# Patient Record
Sex: MALE | Race: BLACK OR AFRICAN AMERICAN | Employment: UNEMPLOYED | URBAN - METROPOLITAN AREA
[De-identification: names, ages, dates, MRNs, and addresses within clinical notes are randomized per-mention and may not be internally consistent; named-entity substitution may affect disease eponyms.]

---

## 2021-01-04 ENCOUNTER — HOSPITAL ENCOUNTER (EMERGENCY)
Age: 31
Discharge: ANOTHER HOSPITAL | End: 2021-01-05
Attending: EMERGENCY MEDICINE
Payer: MEDICAID

## 2021-01-04 ENCOUNTER — APPOINTMENT (OUTPATIENT)
Dept: CT IMAGING | Age: 31
End: 2021-01-04
Attending: EMERGENCY MEDICINE
Payer: MEDICAID

## 2021-01-04 ENCOUNTER — APPOINTMENT (OUTPATIENT)
Dept: GENERAL RADIOLOGY | Age: 31
End: 2021-01-04
Attending: EMERGENCY MEDICINE
Payer: MEDICAID

## 2021-01-04 DIAGNOSIS — K12.2 ABSCESS OF ORAL SPACE: Primary | ICD-10-CM

## 2021-01-04 LAB
ALBUMIN SERPL-MCNC: 3.8 G/DL (ref 3.4–5)
ALBUMIN/GLOB SERPL: 1.1 {RATIO} (ref 0.8–1.7)
ALP SERPL-CCNC: 94 U/L (ref 45–117)
ALT SERPL-CCNC: 51 U/L (ref 16–61)
ANION GAP SERPL CALC-SCNC: 7 MMOL/L (ref 3–18)
APPEARANCE UR: CLEAR
AST SERPL-CCNC: 41 U/L (ref 10–38)
BASOPHILS # BLD: 0 K/UL (ref 0–0.1)
BASOPHILS NFR BLD: 0 % (ref 0–2)
BILIRUB SERPL-MCNC: 0.8 MG/DL (ref 0.2–1)
BILIRUB UR QL: NEGATIVE
BUN SERPL-MCNC: 10 MG/DL (ref 7–18)
BUN/CREAT SERPL: 9 (ref 12–20)
CALCIUM SERPL-MCNC: 8.5 MG/DL (ref 8.5–10.1)
CHLORIDE SERPL-SCNC: 102 MMOL/L (ref 100–111)
CO2 SERPL-SCNC: 26 MMOL/L (ref 21–32)
COLOR UR: YELLOW
CREAT SERPL-MCNC: 1.07 MG/DL (ref 0.6–1.3)
DIFFERENTIAL METHOD BLD: ABNORMAL
EOSINOPHIL # BLD: 0.1 K/UL (ref 0–0.4)
EOSINOPHIL NFR BLD: 1 % (ref 0–5)
ERYTHROCYTE [DISTWIDTH] IN BLOOD BY AUTOMATED COUNT: 14.3 % (ref 11.6–14.5)
GLOBULIN SER CALC-MCNC: 3.5 G/DL (ref 2–4)
GLUCOSE SERPL-MCNC: 121 MG/DL (ref 74–99)
GLUCOSE UR STRIP.AUTO-MCNC: NEGATIVE MG/DL
HCT VFR BLD AUTO: 40.7 % (ref 36–48)
HGB BLD-MCNC: 13.6 G/DL (ref 13–16)
HGB UR QL STRIP: NEGATIVE
KETONES UR QL STRIP.AUTO: ABNORMAL MG/DL
LACTATE BLD-SCNC: 1.14 MMOL/L (ref 0.4–2)
LEUKOCYTE ESTERASE UR QL STRIP.AUTO: NEGATIVE
LYMPHOCYTES # BLD: 1.7 K/UL (ref 0.9–3.6)
LYMPHOCYTES NFR BLD: 14 % (ref 21–52)
MCH RBC QN AUTO: 29.8 PG (ref 24–34)
MCHC RBC AUTO-ENTMCNC: 33.4 G/DL (ref 31–37)
MCV RBC AUTO: 89.1 FL (ref 74–97)
MONOCYTES # BLD: 1.5 K/UL (ref 0.05–1.2)
MONOCYTES NFR BLD: 12 % (ref 3–10)
NEUTS SEG # BLD: 9.3 K/UL (ref 1.8–8)
NEUTS SEG NFR BLD: 73 % (ref 40–73)
NITRITE UR QL STRIP.AUTO: NEGATIVE
PH UR STRIP: 5 [PH] (ref 5–8)
PLATELET # BLD AUTO: 228 K/UL (ref 135–420)
PMV BLD AUTO: 10 FL (ref 9.2–11.8)
POTASSIUM SERPL-SCNC: 3.8 MMOL/L (ref 3.5–5.5)
PROT SERPL-MCNC: 7.3 G/DL (ref 6.4–8.2)
PROT UR STRIP-MCNC: NEGATIVE MG/DL
RBC # BLD AUTO: 4.57 M/UL (ref 4.7–5.5)
SODIUM SERPL-SCNC: 135 MMOL/L (ref 136–145)
SP GR UR REFRACTOMETRY: 1.02 (ref 1–1.03)
UROBILINOGEN UR QL STRIP.AUTO: 0.2 EU/DL (ref 0.2–1)
WBC # BLD AUTO: 12.5 K/UL (ref 4.6–13.2)

## 2021-01-04 PROCEDURE — 96375 TX/PRO/DX INJ NEW DRUG ADDON: CPT

## 2021-01-04 PROCEDURE — 80053 COMPREHEN METABOLIC PANEL: CPT

## 2021-01-04 PROCEDURE — 74011000636 HC RX REV CODE- 636: Performed by: EMERGENCY MEDICINE

## 2021-01-04 PROCEDURE — 99285 EMERGENCY DEPT VISIT HI MDM: CPT

## 2021-01-04 PROCEDURE — 71045 X-RAY EXAM CHEST 1 VIEW: CPT

## 2021-01-04 PROCEDURE — 96365 THER/PROPH/DIAG IV INF INIT: CPT

## 2021-01-04 PROCEDURE — 81003 URINALYSIS AUTO W/O SCOPE: CPT

## 2021-01-04 PROCEDURE — 74011000258 HC RX REV CODE- 258: Performed by: EMERGENCY MEDICINE

## 2021-01-04 PROCEDURE — 85025 COMPLETE CBC W/AUTO DIFF WBC: CPT

## 2021-01-04 PROCEDURE — 93005 ELECTROCARDIOGRAM TRACING: CPT

## 2021-01-04 PROCEDURE — 87040 BLOOD CULTURE FOR BACTERIA: CPT

## 2021-01-04 PROCEDURE — 87635 SARS-COV-2 COVID-19 AMP PRB: CPT

## 2021-01-04 PROCEDURE — 74011250636 HC RX REV CODE- 250/636: Performed by: EMERGENCY MEDICINE

## 2021-01-04 PROCEDURE — 83605 ASSAY OF LACTIC ACID: CPT

## 2021-01-04 PROCEDURE — 70487 CT MAXILLOFACIAL W/DYE: CPT

## 2021-01-04 RX ORDER — MORPHINE SULFATE 4 MG/ML
8 INJECTION, SOLUTION INTRAMUSCULAR; INTRAVENOUS
Status: DISCONTINUED | OUTPATIENT
Start: 2021-01-04 | End: 2021-01-04

## 2021-01-04 RX ORDER — MORPHINE SULFATE 4 MG/ML
8 INJECTION INTRAVENOUS
Status: COMPLETED | OUTPATIENT
Start: 2021-01-04 | End: 2021-01-04

## 2021-01-04 RX ORDER — SODIUM CHLORIDE 0.9 % (FLUSH) 0.9 %
5-10 SYRINGE (ML) INJECTION AS NEEDED
Status: DISCONTINUED | OUTPATIENT
Start: 2021-01-04 | End: 2021-01-05 | Stop reason: HOSPADM

## 2021-01-04 RX ADMIN — SODIUM CHLORIDE 1000 ML: 900 INJECTION, SOLUTION INTRAVENOUS at 21:09

## 2021-01-04 RX ADMIN — SODIUM CHLORIDE 3 G: 900 INJECTION INTRAVENOUS at 19:43

## 2021-01-04 RX ADMIN — MORPHINE SULFATE 8 MG: 4 INJECTION INTRAVENOUS at 19:47

## 2021-01-04 RX ADMIN — SODIUM CHLORIDE 1000 ML: 900 INJECTION, SOLUTION INTRAVENOUS at 19:43

## 2021-01-04 RX ADMIN — IOPAMIDOL 100 ML: 612 INJECTION, SOLUTION INTRAVENOUS at 21:34

## 2021-01-05 VITALS
HEIGHT: 72 IN | RESPIRATION RATE: 24 BRPM | DIASTOLIC BLOOD PRESSURE: 94 MMHG | WEIGHT: 245 LBS | BODY MASS INDEX: 33.18 KG/M2 | SYSTOLIC BLOOD PRESSURE: 148 MMHG | HEART RATE: 94 BPM | OXYGEN SATURATION: 100 % | TEMPERATURE: 98.7 F

## 2021-01-05 LAB
ATRIAL RATE: 122 BPM
CALCULATED P AXIS, ECG09: 65 DEGREES
CALCULATED R AXIS, ECG10: 62 DEGREES
CALCULATED T AXIS, ECG11: 36 DEGREES
COVID-19 RAPID TEST, COVR: NOT DETECTED
DIAGNOSIS, 93000: NORMAL
HEALTH STATUS, XMCV2T: NORMAL
P-R INTERVAL, ECG05: 128 MS
Q-T INTERVAL, ECG07: 338 MS
QRS DURATION, ECG06: 84 MS
QTC CALCULATION (BEZET), ECG08: 481 MS
SOURCE, COVRS: NORMAL
SPECIMEN TYPE, XMCV1T: NORMAL
VENTRICULAR RATE, ECG03: 122 BPM

## 2021-01-05 PROCEDURE — 74011250636 HC RX REV CODE- 250/636: Performed by: EMERGENCY MEDICINE

## 2021-01-05 PROCEDURE — 74011000258 HC RX REV CODE- 258: Performed by: EMERGENCY MEDICINE

## 2021-01-05 PROCEDURE — 96367 TX/PROPH/DG ADDL SEQ IV INF: CPT

## 2021-01-05 PROCEDURE — 74011250637 HC RX REV CODE- 250/637: Performed by: EMERGENCY MEDICINE

## 2021-01-05 RX ORDER — ACETAMINOPHEN 500 MG
1000 TABLET ORAL ONCE
Status: COMPLETED | OUTPATIENT
Start: 2021-01-05 | End: 2021-01-05

## 2021-01-05 RX ADMIN — AMPICILLIN SODIUM AND SULBACTAM SODIUM 3 G: 2; 1 INJECTION, POWDER, FOR SOLUTION INTRAMUSCULAR; INTRAVENOUS at 02:07

## 2021-01-05 RX ADMIN — ACETAMINOPHEN 1000 MG: 500 TABLET ORAL at 02:35

## 2021-01-05 NOTE — ED NOTES
Report given to Mr Angelica Warren [place] with [facility]. He was also given the transfer paperwork.

## 2021-01-05 NOTE — ED PROVIDER NOTES
EMERGENCY DEPARTMENT HISTORY AND PHYSICAL EXAM    Date: 1/4/2021  Patient Name: Marcos Chi    History of Presenting Illness     Chief Complaint   Patient presents with    Allergic Reaction         History Provided By: Patient    7:03 PM  Marcos Chi is a 35 y.o. male with PMHX of active tobacco smoker who presents to the emergency department C/O throat swelling, jaw pain, difficulty swallowing. Per patient he cracked a tooth last week in his right lower molar and had progressively worsening pain there. He went to emergency department yesterday and was started on Pen-Vee K which she has been taking but he states today he has gotten more swollen and had increase in the pain and difficulty swallowing. Pain is worse with trying to open his mouth or swallow. He denies any fever, chest pain, rash, nausea, vomiting, sick contacts, recent travel, other complaints. PCP: Unknown, Provider    Current Facility-Administered Medications   Medication Dose Route Frequency Provider Last Rate Last Admin    sodium chloride (NS) flush 5-10 mL  5-10 mL IntraVENous PRN Eloy Marin MD        ampicillin-sulbactam (UNASYN) 3 g in 0.9% sodium chloride (MBP/ADV) 100 mL MBP  3 g IntraVENous Q6H Eloy Marin  mL/hr at 01/05/21 0207 3 g at 01/05/21 0207       Past History     Past Medical History:  History reviewed. No pertinent past medical history. Past Surgical History:  History reviewed. No pertinent surgical history. Family History:  History reviewed. No pertinent family history. Social History:  Social History     Tobacco Use    Smoking status: Never Smoker   Substance Use Topics    Alcohol use: Never     Frequency: Never    Drug use: Never       Allergies:  No Known Allergies      Review of Systems   Review of Systems   Constitutional: Negative for fever. HENT: Positive for dental problem, sore throat and trouble swallowing. Respiratory: Negative for cough.     Cardiovascular: Negative for chest pain. All other systems reviewed and are negative. Physical Exam     Vitals:    01/05/21 0500 01/05/21 0530 01/05/21 0600 01/05/21 0630   BP: (!) 148/73 (!) 145/72 (!) 152/83 (!) 148/94   Pulse: 100 (!) 104 (!) 101 94   Resp: (!) 32 20 27 24   Temp:    98.7 °F (37.1 °C)   SpO2: 100% 100% 100% 100%   Weight:       Height:         Physical Exam    Nursing notes and vital signs reviewed    Constitutional: Non toxic appearing, moderate distress  Head: Normocephalic, Atraumatic  Eyes: EOMI  ENT: Clear posterior oropharynx with edema in the right cheek and exquisite tenderness over the inner aspect of the cheek and the lower mandible and gums with no discrete area of fluctuance appreciated and no drainage, uvula midline  Neck: Supple  Cardiovascular: Regular rate and rhythm, no murmurs, rubs, or gallops  Chest: Normal work of breathing and chest excursion bilaterally  Lungs: Clear to ausculation bilaterally  Back: No evidence of trauma or deformity  Extremities: No evidence of trauma or deformity  Skin: Warm and dry, normal cap refill  Neuro: Alert and appropriate, face symmetric, normal speech  Psychiatric: Normal mood and affect      Diagnostic Study Results     Labs -     Recent Results (from the past 12 hour(s))   METABOLIC PANEL, COMPREHENSIVE    Collection Time: 01/04/21  7:20 PM   Result Value Ref Range    Sodium 135 (L) 136 - 145 mmol/L    Potassium 3.8 3.5 - 5.5 mmol/L    Chloride 102 100 - 111 mmol/L    CO2 26 21 - 32 mmol/L    Anion gap 7 3.0 - 18 mmol/L    Glucose 121 (H) 74 - 99 mg/dL    BUN 10 7.0 - 18 MG/DL    Creatinine 1.07 0.6 - 1.3 MG/DL    BUN/Creatinine ratio 9 (L) 12 - 20      GFR est AA >60 >60 ml/min/1.73m2    GFR est non-AA >60 >60 ml/min/1.73m2    Calcium 8.5 8.5 - 10.1 MG/DL    Bilirubin, total 0.8 0.2 - 1.0 MG/DL    ALT (SGPT) 51 16 - 61 U/L    AST (SGOT) 41 (H) 10 - 38 U/L    Alk.  phosphatase 94 45 - 117 U/L    Protein, total 7.3 6.4 - 8.2 g/dL    Albumin 3.8 3.4 - 5.0 g/dL Globulin 3.5 2.0 - 4.0 g/dL    A-G Ratio 1.1 0.8 - 1.7     CBC WITH AUTOMATED DIFF    Collection Time: 01/04/21  7:20 PM   Result Value Ref Range    WBC 12.5 4.6 - 13.2 K/uL    RBC 4.57 (L) 4.70 - 5.50 M/uL    HGB 13.6 13.0 - 16.0 g/dL    HCT 40.7 36.0 - 48.0 %    MCV 89.1 74.0 - 97.0 FL    MCH 29.8 24.0 - 34.0 PG    MCHC 33.4 31.0 - 37.0 g/dL    RDW 14.3 11.6 - 14.5 %    PLATELET 575 431 - 599 K/uL    MPV 10.0 9.2 - 11.8 FL    NEUTROPHILS 73 40 - 73 %    LYMPHOCYTES 14 (L) 21 - 52 %    MONOCYTES 12 (H) 3 - 10 %    EOSINOPHILS 1 0 - 5 %    BASOPHILS 0 0 - 2 %    ABS. NEUTROPHILS 9.3 (H) 1.8 - 8.0 K/UL    ABS. LYMPHOCYTES 1.7 0.9 - 3.6 K/UL    ABS. MONOCYTES 1.5 (H) 0.05 - 1.2 K/UL    ABS. EOSINOPHILS 0.1 0.0 - 0.4 K/UL    ABS.  BASOPHILS 0.0 0.0 - 0.1 K/UL    DF AUTOMATED     POC LACTIC ACID    Collection Time: 01/04/21  7:33 PM   Result Value Ref Range    Lactic Acid (POC) 1.14 0.40 - 2.00 mmol/L   EKG, 12 LEAD, INITIAL    Collection Time: 01/04/21  8:00 PM   Result Value Ref Range    Ventricular Rate 122 BPM    Atrial Rate 122 BPM    P-R Interval 128 ms    QRS Duration 84 ms    Q-T Interval 338 ms    QTC Calculation (Bezet) 481 ms    Calculated P Axis 65 degrees    Calculated R Axis 62 degrees    Calculated T Axis 36 degrees    Diagnosis       Sinus tachycardia  Nonspecific ST abnormality  Abnormal ECG  No previous ECGs available     URINALYSIS W/ RFLX MICROSCOPIC    Collection Time: 01/04/21 10:09 PM   Result Value Ref Range    Color YELLOW      Appearance CLEAR      Specific gravity 1.025 1.005 - 1.030      pH (UA) 5.0 5.0 - 8.0      Protein Negative NEG mg/dL    Glucose Negative NEG mg/dL    Ketone TRACE (A) NEG mg/dL    Bilirubin Negative NEG      Blood Negative NEG      Urobilinogen 0.2 0.2 - 1.0 EU/dL    Nitrites Negative NEG      Leukocyte Esterase Negative NEG     SARS-COV-2    Collection Time: 01/04/21 11:39 PM   Result Value Ref Range    Specimen source NP SWAB     COVID-19 rapid test Not detected NOTD Specimen type NP Swab      Health status NP Swab         Radiologic Studies -   CT MAXILLOFACIAL W CONT   Final Result   IMPRESSION:      Periapical lucencies along the right side of the posterior mandibular teeth   suggesting dental disease. There is a rim-enhancing 1.8 x 0.6 cm fluid   collection abutting the medial cortex of the right side of the mandible   suggesting a subperiosteal abscess. Advise dental consultation. Enlarged right cervical lymph nodes which may be reactive. Follow-up   recommended. XR CHEST PORT   Final Result   IMPRESSION:  No acute process        CT Results  (Last 48 hours)               01/04/21 2144  CT MAXILLOFACIAL W CONT Final result    Impression:  IMPRESSION:       Periapical lucencies along the right side of the posterior mandibular teeth   suggesting dental disease. There is a rim-enhancing 1.8 x 0.6 cm fluid   collection abutting the medial cortex of the right side of the mandible   suggesting a subperiosteal abscess. Advise dental consultation. Enlarged right cervical lymph nodes which may be reactive. Follow-up   recommended. Narrative:  EXAM: CT of the maxillofacial       INDICATION: Cracked tooth on the right side rule out infection right side of   mandible       COMPARISON: None. TECHNIQUE: Axial CT imaging of the maxillofacial was performed without   intravenous contrast. Multiplanar reformats were generated. One or more dose   reduction techniques were used on this CT: automated exposure control,   adjustment of the mAs and/or kVp according to patient size, and iterative   reconstruction techniques. The specific techniques used on this CT exam have   been documented in the patient's electronic medical record.  Digital Imaging and   Communications in Medicine (DICOM) format image data are available to   nonaffiliated external healthcare facilities or entities on a secure, media   free, reciprocally searchable basis with patient authorization for at least a   12-month period after this study. _______________       FINDINGS:       There are no acute fractures. There is a 9 mm osteoma in the left ethmoid   sinuses. There is a 2 cm polyp and/or retention cyst the anterior wall the right   maxillary sinus and a 15 mm polyp or retention cyst along the medial wall of the   right maxillary sinus. No air-fluid levels seen. Mastoid sinuses are clear. There is slight periapical lucency along the right side of mandibular tooth   suggesting dental disease. Visualized brain is unremarkable. Orbits and globes are intact. There is a 1.8 x   0.6 cm rim-enhancing fluid collection abutting the medial cortex of the right   side of the mandible suggesting subperiosteal abscess. There are borderline size   submandibular lymph nodes likely reactive. Mildly enlarged right jugulodigastric   lymph nodes are seen measuring up to 11 mm again likely reactive. Visualized   parotid glands and submandibular glands are unremarkable. Visualized vocal cords, hypopharynx, tongue base, oropharynx and posterior   nasopharynx are unremarkable.       _______________               CXR Results  (Last 48 hours)               01/04/21 1938  XR CHEST PORT Final result    Impression:  IMPRESSION:  No acute process       Narrative:  EXAM:  AP Portable Chest X-ray 1 view        INDICATION: Shortness of breath       COMPARISON: None       _______________       FINDINGS:  Heart and mediastinal contours are within normal limits for portable   radiograph. Lungs are clear of active disease. There are no pleural effusions. No acute osseous findings.        ________________                     Medications given in the ED-  Medications   sodium chloride (NS) flush 5-10 mL (has no administration in time range)   ampicillin-sulbactam (UNASYN) 3 g in 0.9% sodium chloride (MBP/ADV) 100 mL MBP (3 g IntraVENous New Bag 1/5/21 4935)   ampicillin-sulbactam (UNASYN) 3 g in 0.9% sodium chloride (MBP/ADV) 100 mL MBP (0 g IntraVENous IV Completed 1/4/21 2013)   morphine injection 8 mg (8 mg IntraVENous Given 1/4/21 1947)   sodium chloride 0.9 % bolus infusion 1,000 mL (0 mL IntraVENous IV Completed 1/5/21 0200)   sodium chloride 0.9 % bolus infusion 1,000 mL (0 mL IntraVENous IV Completed 1/5/21 0200)   iopamidoL (ISOVUE 300) 61 % contrast injection  mL (100 mL IntraVENous Given 1/4/21 2134)   acetaminophen (TYLENOL) tablet 1,000 mg (1,000 mg Oral Given 1/5/21 0235)         Medical Decision Making   I am the first provider for this patient. I reviewed the vital signs, available nursing notes, past medical history, past surgical history, family history and social history. Vital Signs-Reviewed the patient's vital signs. Pulse Oximetry Analysis - 98% on room air, not hypoxic     Cardiac Monitor:  Rate: 115 bpm  Rhythm: Sinus tachycardia    EKG interpretation: (Preliminary)  EKG read by Dr. Kathrin Singh at 8:05 PM  Sinus tachycardia at a rate of 122 bpm, [place] interval of 128 ms, QRS duration of 84 ms, no prior available for comparison    Records Reviewed: Nursing Notes    Provider Notes (Medical Decision Making): Giselle Lai is a 35 y.o. male presenting with history and exam concerning for deep space dental infection. CT reveals subperiosteal abscess. IV antibiotics initiated and discussed with OMFS who recommends transfer to [facility] for OMFS surgical management that is not available at this facility. Patient understands and agrees with this plan. Procedures:  Procedures    ED Course:   10:46 PM  Updated patient on all results and plan. All questions answered. CONSULT NOTE:   10:50 PM  Dr. Kathrin Singh spoke with Dr. Stephanie Dorsey   Specialty: Oral Surgery at [facility]  Discussed pt's hx, disposition, and available diagnostic and imaging results over the telephone. Reviewed care plans. Rapid Covid and transfer to [facility].      CONSULT NOTE:   10:59 PM  Dr. Kathrin Singh spoke with  Yosi Masters   Specialty: ED Attending  Discussed pt's hx, disposition, and available diagnostic and imaging results over the telephone. Reviewed care plans. Accepts for transfer. 11:45 PM  Updated patient on all results and plan. All questions answered. Diagnosis and Disposition     Critical Care: None    TRANSFER NOTE:  11:45 PM  Pt is being transferred to ED at [facility], transfer accepted by Dr. Yosi Masters. The reasons for pt's transfer have been discussed with the pt and available family. They convey agreement and understanding for the need to be transferred as explained to them by Dr. Srinivasan Minder:    1. Abscess of oral space        PLAN:  1. Transfer to [facility] for OMFS management  _______________________________      Please note that this dictation was completed with Dabble, the computer voice recognition software. Quite often unanticipated grammatical, syntax, homophones, and other interpretive errors are inadvertently transcribed by the computer software. Please disregard these errors. Please excuse any errors that have escaped final proofreading.

## 2021-01-10 LAB
BACTERIA SPEC CULT: NORMAL
BACTERIA SPEC CULT: NORMAL
SERVICE CMNT-IMP: NORMAL
SERVICE CMNT-IMP: NORMAL